# Patient Record
Sex: MALE | URBAN - METROPOLITAN AREA
[De-identification: names, ages, dates, MRNs, and addresses within clinical notes are randomized per-mention and may not be internally consistent; named-entity substitution may affect disease eponyms.]

---

## 2024-09-16 ENCOUNTER — APPOINTMENT (RX ONLY)
Dept: URBAN - METROPOLITAN AREA CLINIC 58 | Facility: CLINIC | Age: 59
Setting detail: DERMATOLOGY
End: 2024-09-16

## 2024-09-16 DIAGNOSIS — L20.89 OTHER ATOPIC DERMATITIS: ICD-10-CM

## 2024-09-16 DIAGNOSIS — L73.8 OTHER SPECIFIED FOLLICULAR DISORDERS: ICD-10-CM

## 2024-09-16 PROCEDURE — ? PRESCRIPTION MEDICATION MANAGEMENT

## 2024-09-16 PROCEDURE — ? PRESCRIPTION

## 2024-09-16 PROCEDURE — ? COUNSELING

## 2024-09-16 PROCEDURE — 99204 OFFICE O/P NEW MOD 45 MIN: CPT

## 2024-09-16 RX ORDER — TRIAMCINOLONE ACETONIDE 1 MG/G
CREAM TOPICAL
Qty: 80 | Refills: 1 | Status: ERX | COMMUNITY
Start: 2024-09-16

## 2024-09-16 RX ADMIN — TRIAMCINOLONE ACETONIDE: 1 CREAM TOPICAL at 00:00

## 2024-09-16 ASSESSMENT — LOCATION DETAILED DESCRIPTION DERM
LOCATION DETAILED: LEFT POPLITEAL SKIN
LOCATION DETAILED: LEFT AXILLARY VAULT
LOCATION DETAILED: LEFT CENTRAL MALAR CHEEK
LOCATION DETAILED: RIGHT POPLITEAL SKIN
LOCATION DETAILED: LEFT INFERIOR UPPER BACK
LOCATION DETAILED: RIGHT INFERIOR UPPER BACK

## 2024-09-16 ASSESSMENT — LOCATION SIMPLE DESCRIPTION DERM
LOCATION SIMPLE: LEFT CHEEK
LOCATION SIMPLE: LEFT POPLITEAL SKIN
LOCATION SIMPLE: RIGHT UPPER BACK
LOCATION SIMPLE: LEFT UPPER BACK
LOCATION SIMPLE: RIGHT POPLITEAL SKIN
LOCATION SIMPLE: LEFT AXILLARY VAULT

## 2024-09-16 ASSESSMENT — LOCATION ZONE DERM
LOCATION ZONE: FACE
LOCATION ZONE: AXILLAE
LOCATION ZONE: TRUNK
LOCATION ZONE: LEG

## 2024-09-16 ASSESSMENT — ITCH NUMERIC RATING SCALE: HOW SEVERE IS YOUR ITCHING?: 5

## 2024-09-16 ASSESSMENT — BSA RASH: BSA RASH: 15

## 2024-09-16 NOTE — PROCEDURE: PRESCRIPTION MEDICATION MANAGEMENT
Detail Level: Zone
Initiate Treatment: TAC 0.1% Ointment twice daily up to 5/week and moisturize every day
Render In Strict Bullet Format?: No
Samples Given: Cetaphil and Laroche Posay

## 2025-03-11 ENCOUNTER — RX ONLY (RX ONLY)
Age: 60
End: 2025-03-11

## 2025-03-11 RX ORDER — TRIAMCINOLONE ACETONIDE 1 MG/G
CREAM TOPICAL
Qty: 80 | Refills: 1 | Status: ERX